# Patient Record
Sex: FEMALE | Race: WHITE | NOT HISPANIC OR LATINO | Employment: FULL TIME | URBAN - METROPOLITAN AREA
[De-identification: names, ages, dates, MRNs, and addresses within clinical notes are randomized per-mention and may not be internally consistent; named-entity substitution may affect disease eponyms.]

---

## 2023-02-03 PROBLEM — L91.8 SKIN TAG: Status: ACTIVE | Noted: 2023-02-03

## 2023-02-03 PROBLEM — K62.5 RECTAL BLEEDING: Status: ACTIVE | Noted: 2023-02-03

## 2023-02-03 PROBLEM — R23.8 SCALP IRRITATION: Status: ACTIVE | Noted: 2023-02-03

## 2023-02-03 PROBLEM — K64.9 HEMORRHOIDS: Status: ACTIVE | Noted: 2023-02-03

## 2023-02-03 PROBLEM — F40.298 NEEDLE PHOBIA: Status: ACTIVE | Noted: 2023-02-03

## 2023-02-03 RX ORDER — MUPIROCIN 20 MG/G
OINTMENT TOPICAL
COMMUNITY
Start: 2022-11-03

## 2023-03-17 ENCOUNTER — OFFICE VISIT (OUTPATIENT)
Dept: PRIMARY CARE | Facility: CLINIC | Age: 43
End: 2023-03-17
Payer: COMMERCIAL

## 2023-03-17 VITALS
DIASTOLIC BLOOD PRESSURE: 78 MMHG | HEIGHT: 68 IN | WEIGHT: 169 LBS | SYSTOLIC BLOOD PRESSURE: 110 MMHG | HEART RATE: 80 BPM | BODY MASS INDEX: 25.61 KG/M2 | TEMPERATURE: 97.9 F

## 2023-03-17 DIAGNOSIS — Z00.00 ANNUAL PHYSICAL EXAM: Primary | ICD-10-CM

## 2023-03-17 DIAGNOSIS — E55.9 VITAMIN D DEFICIENCY: ICD-10-CM

## 2023-03-17 DIAGNOSIS — Z12.31 ENCOUNTER FOR SCREENING MAMMOGRAM FOR MALIGNANT NEOPLASM OF BREAST: ICD-10-CM

## 2023-03-17 PROCEDURE — 99396 PREV VISIT EST AGE 40-64: CPT | Performed by: FAMILY MEDICINE

## 2023-03-17 ASSESSMENT — PATIENT HEALTH QUESTIONNAIRE - PHQ9
2. FEELING DOWN, DEPRESSED OR HOPELESS: SEVERAL DAYS
1. LITTLE INTEREST OR PLEASURE IN DOING THINGS: NOT AT ALL
SUM OF ALL RESPONSES TO PHQ9 QUESTIONS 1 AND 2: 1

## 2023-03-17 NOTE — PROGRESS NOTES
"Subjective    Lissette Francois is a 42 y.o. female who presents for Annual Exam (phys).    Obgyn herrera rivero?         Objective   /78   Pulse 80   Temp 36.6 °C (97.9 °F)   Ht 1.715 m (5' 7.5\")   Wt 76.7 kg (169 lb)   BMI 26.08 kg/m²     Physical Exam  Vitals reviewed.   Constitutional:       General: She is not in acute distress.     Appearance: Normal appearance.   HENT:      Head: Normocephalic and atraumatic.   Eyes:      General: No scleral icterus.     Conjunctiva/sclera: Conjunctivae normal.   Cardiovascular:      Rate and Rhythm: Normal rate and regular rhythm.      Heart sounds: No murmur heard.  Pulmonary:      Effort: Pulmonary effort is normal.      Breath sounds: No wheezing, rhonchi or rales.   Abdominal:      General: Bowel sounds are normal.      Palpations: Abdomen is soft.      Tenderness: There is no abdominal tenderness.   Musculoskeletal:      Right lower leg: No edema.      Left lower leg: No edema.   Skin:     General: Skin is warm and dry.      Findings: No rash.   Neurological:      General: No focal deficit present.      Mental Status: She is alert.      Gait: Gait normal.   Psychiatric:         Mood and Affect: Mood normal.         Behavior: Behavior normal.         Assessment/Plan   Problem List Items Addressed This Visit    None  Visit Diagnoses       Annual physical exam    -  Primary    Relevant Orders    CBC    Comprehensive Metabolic Panel    Lipid Panel    Encounter for screening mammogram for malignant neoplasm of breast        Relevant Orders    BI mammo bilateral screening tomosynthesis    Vitamin D deficiency        Relevant Orders    Vitamin D, Total               "

## 2023-05-01 ENCOUNTER — TELEPHONE (OUTPATIENT)
Dept: PRIMARY CARE | Facility: CLINIC | Age: 43
End: 2023-05-01
Payer: COMMERCIAL

## 2023-05-01 DIAGNOSIS — L91.8 SKIN TAGS, MULTIPLE ACQUIRED: ICD-10-CM

## 2023-05-01 DIAGNOSIS — K64.4 ANAL SKIN TAG: Primary | ICD-10-CM

## 2023-05-01 NOTE — TELEPHONE ENCOUNTER
Pt wanted to know if you could place another referral to GI surgery. She saw Loan in 2020 and was supposed to have surgery for a skin tag removal but never scheduled due to covid and it has been awhile since she has seen her. She would like to see Loan again. She liked her  [phone]

## 2023-12-11 NOTE — PROGRESS NOTES
HISTORY OF PRESENTING ILLNESS: Lissette Francois is a 43yoF with c/o anal skin tag, with desire for removal.  She has no significant complaints at this time regarding her skin tag.  She does however have what she describes as significant perianal bleeding.  This hematochezia in fact was the reason why she had a colonoscopy done in 2021.  The bleeding has continued happens about once per month she admits that it is mixed in with stool.  She denies any perianal pain.  Also admits that these occasions of bleeding occur when she has to strain.  She takes no fiber supplements.    She saw Loan Lerner CNP in clinic 6 months ago and is here to discuss possible surgery.     Colonoscopy 1/2021 by Dr. Trujillo and had a hyperplastic and serrated adenoma polyp removed.     Past Medical History:   Diagnosis Date    Encounter for screening for malignant neoplasm of vagina     Vaginal Pap smear         Past Surgical History:   Procedure Laterality Date    OTHER SURGICAL HISTORY  10/22/2020    Nasal septoplasty         Social History     Tobacco Use    Smoking status: Never    Smokeless tobacco: Never   Substance Use Topics    Alcohol use: Never    Drug use: Never         Family History   Problem Relation Name Age of Onset    Heart attack Father      Breast cancer Other Aunt     Other (heart problem) Other           Current Outpatient Medications:     mupirocin (Bactroban) 2 % ointment, APPLY SPARINGLY TO AFFECTED AREA(S) TWICE DAILY, Disp: , Rfl:        No Known Allergies      REVIEW OF SYSTEMS:  Review of Systems   Constitutional: Negative.    Respiratory: Negative.     Cardiovascular: Negative.    Gastrointestinal: Negative.    Genitourinary: Negative.    Skin: Negative.    Neurological: Negative.    Psychiatric/Behavioral: Negative.       Labs:     Imaging:  No results found.     Physical Exam:  Physical Exam  Constitutional:       Appearance: Normal appearance.   HENT:      Head: Normocephalic and atraumatic.   Eyes:       Extraocular Movements: Extraocular movements intact.   Cardiovascular:      Rate and Rhythm: Normal rate and regular rhythm.   Pulmonary:      Effort: Pulmonary effort is normal.      Breath sounds: Normal breath sounds.   Abdominal:      General: Abdomen is flat.      Palpations: Abdomen is soft.   Skin:     General: Skin is warm and dry.   Neurological:      General: No focal deficit present.      Mental Status: She is alert and oriented to person, place, and time.   Psychiatric:         Mood and Affect: Mood normal.     Anoscopy: Patient was placed in the prone position.  Examination of the perianal area revealed a normal anus.  Anterior midline had a 1-2cm non-inflamed skin tag.  A digital exam was then performed and revealed a smooth anal canal with no masses or any abnormalities noted.  Patient had good sphincter tone.  An anoscope was then inserted into the patient's anal canal after it was well-lubricated.  Examination of all 4 quadrants of the anal count revealed grade 1 internal hemorrhoids.  There is no evidence of bleeding no masses no concerning lesions.  Scope was carefully withdrawn as the exam proceeded.  Patient tolerated procedure well without evidence of complication.    ASSESSMENT AND PLAN: 43-year-old female with skin tags and occasional bleeding per anus.  I do think her bleeding is coming from hemorrhoids especially in light of her recent normal colonoscopy.  She will give a trial of Metamucil with 6 to 8 glasses of water daily for the next 6 weeks.  At that time she will return to our office to discuss further treatment options.  Today we had a long discussion about the utility of banding versus surgical intervention.  She will also make a decision regarding her desire to have the skin tag removed at that time.      Gina Busby RN   12/11/2023

## 2023-12-12 ENCOUNTER — OFFICE VISIT (OUTPATIENT)
Dept: SURGERY | Facility: CLINIC | Age: 43
End: 2023-12-12
Payer: COMMERCIAL

## 2023-12-12 VITALS
BODY MASS INDEX: 26.07 KG/M2 | TEMPERATURE: 98.7 F | HEIGHT: 68 IN | WEIGHT: 172 LBS | DIASTOLIC BLOOD PRESSURE: 74 MMHG | HEART RATE: 69 BPM | SYSTOLIC BLOOD PRESSURE: 112 MMHG

## 2023-12-12 DIAGNOSIS — K62.5 RECTAL BLEEDING: Primary | ICD-10-CM

## 2023-12-12 PROCEDURE — 99214 OFFICE O/P EST MOD 30 MIN: CPT | Performed by: COLON & RECTAL SURGERY

## 2023-12-12 PROCEDURE — 46600 DIAGNOSTIC ANOSCOPY SPX: CPT | Performed by: COLON & RECTAL SURGERY

## 2023-12-13 ASSESSMENT — ENCOUNTER SYMPTOMS
GASTROINTESTINAL NEGATIVE: 1
PSYCHIATRIC NEGATIVE: 1
RESPIRATORY NEGATIVE: 1
NEUROLOGICAL NEGATIVE: 1
CARDIOVASCULAR NEGATIVE: 1
CONSTITUTIONAL NEGATIVE: 1

## 2024-03-20 ENCOUNTER — APPOINTMENT (OUTPATIENT)
Dept: PRIMARY CARE | Facility: CLINIC | Age: 44
End: 2024-03-20
Payer: COMMERCIAL

## 2024-09-24 ENCOUNTER — APPOINTMENT (OUTPATIENT)
Dept: PRIMARY CARE | Facility: CLINIC | Age: 44
End: 2024-09-24
Payer: COMMERCIAL

## 2024-09-24 VITALS
OXYGEN SATURATION: 97 % | HEART RATE: 81 BPM | SYSTOLIC BLOOD PRESSURE: 112 MMHG | TEMPERATURE: 97.9 F | BODY MASS INDEX: 26.46 KG/M2 | HEIGHT: 67 IN | RESPIRATION RATE: 17 BRPM | WEIGHT: 168.6 LBS | DIASTOLIC BLOOD PRESSURE: 64 MMHG

## 2024-09-24 DIAGNOSIS — D50.9 IRON DEFICIENCY ANEMIA, UNSPECIFIED IRON DEFICIENCY ANEMIA TYPE: ICD-10-CM

## 2024-09-24 DIAGNOSIS — Z82.49 FAMILY HISTORY OF EARLY CAD: ICD-10-CM

## 2024-09-24 DIAGNOSIS — M72.2 PLANTAR FASCIITIS: ICD-10-CM

## 2024-09-24 DIAGNOSIS — F33.1 MODERATE EPISODE OF RECURRENT MAJOR DEPRESSIVE DISORDER: ICD-10-CM

## 2024-09-24 DIAGNOSIS — Z00.00 ANNUAL PHYSICAL EXAM: Primary | ICD-10-CM

## 2024-09-24 PROBLEM — R23.8 SCALP IRRITATION: Status: RESOLVED | Noted: 2023-02-03 | Resolved: 2024-09-24

## 2024-09-24 PROBLEM — F40.298 NEEDLE PHOBIA: Status: RESOLVED | Noted: 2023-02-03 | Resolved: 2024-09-24

## 2024-09-24 PROBLEM — L91.8 SKIN TAG: Status: RESOLVED | Noted: 2023-02-03 | Resolved: 2024-09-24

## 2024-09-24 PROBLEM — F32.1 CURRENT MODERATE EPISODE OF MAJOR DEPRESSIVE DISORDER (MULTI): Status: ACTIVE | Noted: 2024-09-24

## 2024-09-24 PROBLEM — K64.9 HEMORRHOIDS: Status: RESOLVED | Noted: 2023-02-03 | Resolved: 2024-09-24

## 2024-09-24 PROBLEM — K62.5 RECTAL BLEEDING: Status: RESOLVED | Noted: 2023-02-03 | Resolved: 2024-09-24

## 2024-09-24 PROCEDURE — 99396 PREV VISIT EST AGE 40-64: CPT

## 2024-09-24 PROCEDURE — 1036F TOBACCO NON-USER: CPT

## 2024-09-24 PROCEDURE — 99213 OFFICE O/P EST LOW 20 MIN: CPT

## 2024-09-24 PROCEDURE — 3008F BODY MASS INDEX DOCD: CPT

## 2024-09-24 RX ORDER — MELOXICAM 15 MG/1
15 TABLET ORAL DAILY
Qty: 30 TABLET | Refills: 1 | Status: SHIPPED | OUTPATIENT
Start: 2024-09-24 | End: 2024-11-23

## 2024-09-24 NOTE — ASSESSMENT & PLAN NOTE
I suspect that stepping on the side of the screw (which did not sherman her skin) likely just exacerbated the plantar fasciitis on her left heel.  Will trial meloxicam once daily every day for the next 2 weeks.  This should help with the pain and inflammation.  Also recommended using a tennis ball to massage the plantar fascia.  If this is not improving over the next couple of weeks then could consider further imaging or podiatry referral to ensure that stepping on the screw did not cause some other issue.

## 2024-09-24 NOTE — PROGRESS NOTES
Subjective   Lissette Francois is a 43 y.o. female who is here for Annual Exam and New Patient Visit.    ANNUAL PHYSICAL EXAM    Concerns:  -Patient has a history of bilateral plantar fasciitis.  However a month or 2 ago she also stepped on a screw sideways of her right foot.  She did not break the skin.  However the right heel has been bothering her consistently since then.  There was never any swelling, bruising, redness.  She has not tried any medication such as Tylenol or ibuprofen, massage, ice or heat.      Specialists that pt follows with: obgyn    Preventative:  - Immunizations: UTD on covid x1. Needs Tdap, flu.  - Mammograms: UTD, had diag mammo and b/l breast US 6/2024 due to lump, turns out to be benign cysts, repeat screening mammo 6/2025  - Pap smears: UTD, normal 9/2024, hpv neg, repeat 9/2029  - Colorectal cancer screening: UTD, had diag colonoscopy 1/2021 due to hematochezia but was just from hemorrhoids. Did remove polyps so repeat in 5 years (2026)  - Chlamydia/Gonorrhea testing: Not interested   - 1 time Hep C testing: done  - 1 time HIV testing: done  - Dental care: UTD  - Vision care: UTD    Lifestyle:  - Occupation: Self employed , works from home  - Diet: Well balanced  - Exercise: Regularly  - Alcohol/tobacco/drugs: Denies x3   - Contraception: None, abstinent currently  - Mood: Some depression. Thinks may be related to perimenopause. No interest in anything. No SI, HI. Anxiety is better. Never been on meds. Never been to counseling. Interested in counseling.   - Menstrual periods: Regular    Active Problem List      Comprehensive Medical/Surgical/Social/Family History  Past Medical History:   Diagnosis Date    Anxiety     Depression     Encounter for screening for malignant neoplasm of vagina     Vaginal Pap smear     Past Surgical History:   Procedure Laterality Date    ADENOIDECTOMY      COLONOSCOPY  01/2021    OTHER SURGICAL HISTORY  10/22/2020    Nasal septoplasty     Social  "History     Social History Narrative    Not on file       Allergies and Medications  Patient has no known allergies.  Current Outpatient Medications on File Prior to Visit   Medication Sig Dispense Refill    [DISCONTINUED] mupirocin (Bactroban) 2 % ointment APPLY SPARINGLY TO AFFECTED AREA(S) TWICE DAILY       No current facility-administered medications on file prior to visit.       Objective   /64   Pulse 81   Temp 36.6 °C (97.9 °F)   Resp 17   Ht 1.702 m (5' 7\")   Wt 76.5 kg (168 lb 9.6 oz)   SpO2 97%   BMI 26.41 kg/m²    PHYSICAL EXAM  Gen: Well appearing, in NAD  Eyes: EOMI  HEENT: MMM. TMs normal. Throat normal.  Heart: RRR, no murmurs  Lungs: No increased work of breathing, CTAB, on RA  GI: Soft, NTND, no guarding or rebound  Extremities: WWP, cap refill <2sec, no pitting edema in LE b/l, there is some tenderness to palpation on the plantar surface of the left heel but there is no loose bone or foreign body palpable, and there is no swelling/erythema/bruising.  There is no pain in the right heel  Neuro: Alert, symmetrical facies, moves all extremities equally  Psych: Appropriate mood and affect    Assessment/Plan   - Reviewed Social Determinants of health with patient. Discussed healthy lifestyle, including 150 minutes of physical activity per week.  - Ordered/Reviewed baseline labwork   - Recommended flu shot and updated Tdap shot  - Follow up in 1 year for next annual physical or sooner for acute concerns    Problem List Items Addressed This Visit       Iron deficiency anemia    Current Assessment & Plan     Will obtain new CBC and iron studies at this time         Relevant Orders    Iron and TIBC    Ferritin    Current moderate episode of major depressive disorder (Multi)    Current Assessment & Plan     Patient is interested in trying talk therapy so a psychology referral was provided today.  She is not interested in medication management at this time.         Relevant Orders    Referral to " Psychology    Family history of early CAD    Current Assessment & Plan     Will obtain a CT calcium score test for risk stratification.  Will also obtain lipid panel.  Patient's blood pressure is normal.  She has no cardiac history herself.  She has never been a smoker.  No history of diabetes.         Relevant Orders    CT cardiac scoring wo IV contrast    Plantar fasciitis    Current Assessment & Plan     I suspect that stepping on the side of the screw (which did not sherman her skin) likely just exacerbated the plantar fasciitis on her left heel.  Will trial meloxicam once daily every day for the next 2 weeks.  This should help with the pain and inflammation.  Also recommended using a tennis ball to massage the plantar fascia.  If this is not improving over the next couple of weeks then could consider further imaging or podiatry referral to ensure that stepping on the screw did not cause some other issue.         Relevant Medications    meloxicam (Mobic) 15 mg tablet     Other Visit Diagnoses       Annual physical exam    -  Primary    Relevant Orders    CBC    Comprehensive Metabolic Panel    Hemoglobin A1C    Lipid Panel    TSH with reflex to Free T4 if abnormal    Vitamin B12    Vitamin D 25-Hydroxy,Total (for eval of Vitamin D levels)          Cristy Mccall D.O.  Family Medicine Physician  [facility]  [address] [place] [address] [phone]    This note has been transcribed using Dragon voice recognition system and there is a possibility of unintentional typing misprints.

## 2024-09-24 NOTE — ASSESSMENT & PLAN NOTE
Will obtain a CT calcium score test for risk stratification.  Will also obtain lipid panel.  Patient's blood pressure is normal.  She has no cardiac history herself.  She has never been a smoker.  No history of diabetes.

## 2024-09-24 NOTE — ASSESSMENT & PLAN NOTE
Patient is interested in trying talk therapy so a psychology referral was provided today.  She is not interested in medication management at this time.

## 2024-10-03 ENCOUNTER — APPOINTMENT (OUTPATIENT)
Dept: BEHAVIORAL HEALTH | Facility: CLINIC | Age: 44
End: 2024-10-03
Payer: COMMERCIAL

## 2025-04-08 ENCOUNTER — HOSPITAL ENCOUNTER (OUTPATIENT)
Dept: RADIOLOGY | Facility: HOSPITAL | Age: 45
Discharge: HOME | End: 2025-04-08
Payer: COMMERCIAL

## 2025-04-08 DIAGNOSIS — Z82.49 FAMILY HISTORY OF EARLY CAD: ICD-10-CM

## 2025-04-08 PROCEDURE — 75571 CT HRT W/O DYE W/CA TEST: CPT
